# Patient Record
Sex: FEMALE | Race: WHITE | Employment: FULL TIME | URBAN - METROPOLITAN AREA
[De-identification: names, ages, dates, MRNs, and addresses within clinical notes are randomized per-mention and may not be internally consistent; named-entity substitution may affect disease eponyms.]

---

## 2021-09-24 ENCOUNTER — HOSPITAL ENCOUNTER (EMERGENCY)
Age: 46
Discharge: HOME | End: 2021-09-24
Attending: EMERGENCY MEDICINE
Payer: COMMERCIAL

## 2021-09-24 VITALS
WEIGHT: 213.41 LBS | RESPIRATION RATE: 14 BRPM | HEIGHT: 64 IN | OXYGEN SATURATION: 97 % | TEMPERATURE: 98 F | DIASTOLIC BLOOD PRESSURE: 92 MMHG | SYSTOLIC BLOOD PRESSURE: 136 MMHG | HEART RATE: 99 BPM | BODY MASS INDEX: 36.43 KG/M2

## 2021-09-24 DIAGNOSIS — B08.4 HAND, FOOT AND MOUTH DISEASE: Primary | ICD-10-CM

## 2021-09-24 PROCEDURE — 99283 EMERGENCY DEPT VISIT LOW MDM: CPT

## 2021-09-24 ASSESSMENT — PAIN - FUNCTIONAL ASSESSMENT: PAIN_FUNCTIONAL_ASSESSMENT: 0-10

## 2021-09-24 ASSESSMENT — PAIN SCALES - GENERAL: PAINLEVEL_OUTOF10: 0

## 2021-09-24 NOTE — ED PROVIDER NOTES
CHIEF COMPLAINT  Chief Complaint   Patient presents with    Rash     red rash on both hands and feet x 2 days , currently on Aztihromycin for sore throat       HISTORY OF PRESENT ILLNESS  Broderick Babcock is a 39 y.o. female who presents to the ED complaining of several days of a sore throat and a 1 to 2-day history of painful red lesions on the palms and soles. No fevers or chills. No other systemic rash. No difficulty swallowing. No ear pain. No cough. No headaches. No other complaints, modifying factors or associated symptoms. Nursing notes reviewed. History reviewed. No pertinent past medical history. History reviewed. No pertinent surgical history. History reviewed. No pertinent family history. Social History     Socioeconomic History    Marital status: Not on file     Spouse name: Not on file    Number of children: Not on file    Years of education: Not on file    Highest education level: Not on file   Occupational History    Not on file   Tobacco Use    Smoking status: Never Smoker    Smokeless tobacco: Never Used   Substance and Sexual Activity    Alcohol use: Never    Drug use: Never    Sexual activity: Not on file   Other Topics Concern    Not on file   Social History Narrative    Not on file     Social Determinants of Health     Financial Resource Strain:     Difficulty of Paying Living Expenses:    Food Insecurity:     Worried About Running Out of Food in the Last Year:     [address] in the Last Year:    Transportation Needs:     Lack of Transportation (Medical):      Lack of Transportation (Non-Medical):    Physical Activity:     Days of Exercise per Week:     Minutes of Exercise per Session:    Stress:     Feeling of Stress :    Social Connections:     Frequency of Communication with Friends and Family:     Frequency of Social Gatherings with Friends and Family:     Attends [employer] Services:     Active Member of Clubs or Organizations:     Attends [employer] or Organization Meetings:     Marital Status:    Intimate Partner Violence:     Fear of Current or Ex-Partner:     Emotionally Abused:     Physically Abused:     Sexually Abused:      No current facility-administered medications for this encounter. No current outpatient medications on file. No Known Allergies    REVIEW OF SYSTEMS  Positives and pertinent negatives as per HPI. Six other systems were reviewed and are negative. Nursing notes pertaining to ROS were reviewed. PHYSICAL EXAM   BP (!) 136/92   Pulse 99   Temp 98 °F (36.7 °C) (Oral)   Resp 14   Ht 5' 4\" (1.626 m)   Wt 213 lb 6.5 oz (96.8 kg)   LMP 09/15/2021   SpO2 97%   BMI 36.63 kg/m²   GENERAL APPEARANCE: Awake and alert. Cooperative. No acute distress. HEAD: Normocephalic. Atraumatic. EYES: PERRL. EOM's grossly intact. No scleral icterus, injection or exudate  ENT: Mucous membranes are moist.  Patient has several superficial shallow ulcers on the soft palate but no tonsillar hypertrophy, exudate or palatal petechiae. NECK: Supple. Normal ROM. No lymphadenopathy  CHEST:  Regular rate and rhythm, no murmurs, rubs or gallops  LUNGS: Breathing is unlabored. Speaking comfortably in full sentences. Clear through auscultation bilaterally  ABDOMEN: Nondistended, nontender  EXTREMITIES: Patient has very mildly tender superficial shallow erythematous macules with a erythematous base on the palms and soles. No other extremity or truncal rash. No petechiae  SKIN: Warm and dry. NEUROLOGICAL: Alert and oriented. ED COURSE/MDM  Hand-foot-and-mouth disease: Ibuprofen or Tylenol as needed. Push fluids. Follow-up with PCP as needed. Patient was advised that she could stop taking her Zithromax that was called in for her empirically by her family physician for possible strep. Hypertension:  Patient was hypertensive during the ER visit today.   There are no signs of hypertensive emergency or evidence of end organ damage by history or physical exam.  These findings were discussed with the patient and advised to follow up with primary care physician to further assess and treat hypertension in the outpatient setting. The patient's blood pressure was found to be elevated according to CMS/Medicare and the Affordable Care Act/ObamaCare criteria. Elevated blood pressure could occur because of pain or anxiety or other reasons and does not mean that they need to have their blood pressure treated or medications otherwise adjusted. However, this could also be a sign that they will need to have their blood pressure treated or medications changed. The patient was instructed to take a list of recent blood pressure readings to their next visit with their personal physician. Patient was given scripts for the following medications. I counseled patient how to take these medications. New Prescriptions    No medications on file         CLINICAL IMPRESSION  1. Hand, foot and mouth disease        Blood pressure (!) 136/92, pulse 99, temperature 98 °F (36.7 °C), temperature source Oral, resp. rate 14, height 5' 4\" (1.626 m), weight 213 lb 6.5 oz (96.8 kg), last menstrual period 09/15/2021, SpO2 97 %.       Follow-up with:  [facility]              Nia Lira MD  09/24/21 0063

## 2021-09-24 NOTE — ED NOTES
Discharge and education instructions reviewed. Patient verbalized understanding, teach back successful. Patient denied questions at this time. Instructed to follow up with PCP, referral number given and or return to ED if symptoms worsen.    Ambulatory to exit with no changed in assessment     Shira Bailey RN  09/24/21 7601

## 2021-09-24 NOTE — ED NOTES
Presents with red round tiny bumps on both hands and feet x 2 days. She has sore throat a few days ago and she called PCP and was prescribed Z-pack. Her sore throat resolved with 2 more doses left. Denies pain on hands but c/o pain when she walks. Alert and oriented x 4. Guille,. Pleasant, cooperative. Denies other c/o.  Exam per Dr. Evelyn Lowry, RN  09/24/21 3067